# Patient Record
Sex: MALE | Race: WHITE | URBAN - METROPOLITAN AREA
[De-identification: names, ages, dates, MRNs, and addresses within clinical notes are randomized per-mention and may not be internally consistent; named-entity substitution may affect disease eponyms.]

---

## 2020-07-16 NOTE — PROGRESS NOTES
500 mg by mouth nightly      atenolol (TENORMIN) 50 MG tablet Take 1 tablet by mouth daily 90 tablet 0    omega-3 acid ethyl esters (LOVAZA) 1 G capsule Take 2 capsules by mouth 2 times daily 120 capsule 2    meloxicam (MOBIC) 7.5 MG tablet Take 1 tablet by mouth daily 30 tablet 2    furosemide (LASIX) 20 MG tablet Take 1/2 pill daily for 10 days 5 tablet 0    Cholecalciferol (VITAMIN D3) 65950 UNITS CAPS Take 1 tablet by mouth daily. 30 capsule 2     No current facility-administered medications for this visit. Review of Systems:  · Constitutional: No unanticipated weight loss. There's been no change in energy level, sleep pattern, or activity level. No fevers, chills. · Eyes: No visual changes or diplopia. No scleral icterus. · ENT: No Headaches, hearing loss or vertigo. No mouth sores or sore throat. · Cardiovascular: as reviewed in HPI  · Respiratory: No cough or wheezing, no sputum production. No hemoptysis. · Gastrointestinal: No abdominal pain, appetite loss, blood in stools. No change in bowel or bladder habits. · Genitourinary: No dysuria, trouble voiding, or hematuria. · Musculoskeletal:  No gait disturbance, no joint complaints. · Integumentary: No rash or pruritis. · Neurological: No headache, diplopia, change in muscle strength, numbness or tingling. · Psychiatric: No anxiety or depression. · Endocrine: No temperature intolerance. No excessive thirst, fluid intake, or urination. No tremor. · Hematologic/Lymphatic: No abnormal bruising or bleeding, blood clots or swollen lymph nodes. · Allergic/Immunologic: No nasal congestion or hives.     Physical Exam:   /80 (Site: Left Upper Arm, Position: Sitting)   Pulse 74   Temp 98.1 °F (36.7 °C)   Ht 5' 8\" (1.727 m)   Wt 182 lb (82.6 kg)   SpO2 98%   BMI 27.67 kg/m²   Wt Readings from Last 3 Encounters:   07/17/20 182 lb (82.6 kg)   05/21/15 182 lb 3.2 oz (82.6 kg)   02/20/15 191 lb (86.6 kg)     Constitutional: He is oriented to person, place, and time. He appears well-developed and well-nourished. In no acute distress. Head: Normocephalic and atraumatic. Pupils equal and round. Neck: Neck supple. No JVP or carotid bruit appreciated. No mass and no thyromegaly present. No lymphadenopathy present. Cardiovascular: Irregularly irreg with a normal rate. Normal heart sounds. Exam reveals no gallop and no friction rub. No murmur heard. Pulmonary/Chest: Effort normal and breath sounds normal. No respiratory distress. He has no wheezes, rhonchi or rales. Abdominal: Soft, non-tender. Bowel sounds are normal. He exhibits no organomegaly, mass or bruit. Extremities: No edema. No cyanosis or clubbing. Pulses are 2+ radial/carotid bilaterally. Neurological: No gross cranial nerve deficit. Coordination normal.   Skin: Skin is warm and dry. There is no rash or diaphoresis. Psychiatric: He has a normal mood and affect. His speech is normal and behavior is normal.     Lab Review:   FLP:    Lab Results   Component Value Date    TRIG 81 02/23/2015    HDL 55 02/23/2015    LDLCALC 114 02/23/2015    LABVLDL 28 11/15/2013     BUN/Creatinine:    Lab Results   Component Value Date    BUN 18 02/23/2015    CREATININE 1.01 02/23/2015       EKG Interpretation: 7/16/20 Atrial fibrillation. Nonspecific QRS widening. Image Review:   No stress test or echo on file     Assessment/Plan:  1) Chronic atrial fibrillation. Currently in Afib. Seemingly asymptomatic. CHADS-Vasc is 1 (HTN). Treatment options for atrial fibrillation discussed including rate control, anticoagulation options, antiarrhythmics, and cardioversion. Will arrange for an echocardiogram to further assess LVEF or any valvular abnormalities. He is agreeable with starting  mg daily and instructed to start after his surgery. Continue rate control strategy with beta-blocker. 2) Pre-operative risk assessment. Patient is low cardiac risk based on RCRI score of zero.  Patient's risk should not preclude him from proceeding with surgery. Suggest continuation of B-blocker in the david-operative period and instructed to start  mg daily after his surgery. 3) Essential hypertension. Controlled. Goal BP <130/80. Continue medical therapy. Follow up in 1 year. Thank you very much for allowing me to participate in the care of your patient. Please do not hesitate to contact me if you have any questions. Sincerely,  Trent Luna. Tianna Costa, [address] [place] [address] [place] [address]  Ph: [phone]  Fax: [phone]      This note was scribed in the presence of Dr Tianna Costa MD by Amaya Wood RN. Physician Attestation: The scribes documentation has been prepared under my direction and personally reviewed by me in its entirety. I confirm that the note above accurately reflects all work, treatment, procedures, and medical decision making performed by me. All portions of the note including but not limited to the chief complaint, history of present illness, physical exam, assessment and plan/medical decision making were personally reviewed, edited, and updated on the day of the visit.

## 2020-07-17 ENCOUNTER — OFFICE VISIT (OUTPATIENT)
Dept: CARDIOLOGY CLINIC | Age: 65
End: 2020-07-17
Payer: COMMERCIAL

## 2020-07-17 VITALS
WEIGHT: 182 LBS | TEMPERATURE: 98.1 F | SYSTOLIC BLOOD PRESSURE: 128 MMHG | HEIGHT: 68 IN | DIASTOLIC BLOOD PRESSURE: 80 MMHG | HEART RATE: 74 BPM | OXYGEN SATURATION: 98 % | BODY MASS INDEX: 27.58 KG/M2

## 2020-07-17 PROCEDURE — 93000 ELECTROCARDIOGRAM COMPLETE: CPT | Performed by: INTERNAL MEDICINE

## 2020-07-17 PROCEDURE — 99204 OFFICE O/P NEW MOD 45 MIN: CPT | Performed by: INTERNAL MEDICINE

## 2020-07-17 RX ORDER — UBIDECARENONE 75 MG
50 CAPSULE ORAL DAILY
COMMUNITY

## 2020-07-17 RX ORDER — METOPROLOL TARTRATE 100 MG/1
100 TABLET ORAL 2 TIMES DAILY
COMMUNITY

## 2020-07-17 RX ORDER — MULTIVIT WITH MINERALS/LUTEIN
1000 TABLET ORAL DAILY
COMMUNITY

## 2020-07-17 RX ORDER — LANOLIN ALCOHOL/MO/W.PET/CERES
500 CREAM (GRAM) TOPICAL NIGHTLY
COMMUNITY

## 2020-07-17 NOTE — PATIENT INSTRUCTIONS
shot. If you have had one before, ask your doctor whether you need another dose. Get a flu shot every year. If you must be around people with colds or flu, wash your hands often. Activity  · If your doctor recommends it, get more exercise. Walking is a good choice. Bit by bit, increase the amount you walk every day. Try for at least 30 minutes on most days of the week. You also may want to swim, bike, or do other activities. Your doctor may suggest that you join a cardiac rehabilitation program so that you can have help increasing your physical activity safely. · Start light exercise if your doctor says it is okay. Even a small amount will help you get stronger, have more energy, and manage stress. Walking is an easy way to get exercise. Start out by walking a little more than you did in the hospital. Gradually increase the amount you walk. · When you exercise, watch for signs that your heart is working too hard. You are pushing too hard if you cannot talk while you are exercising. If you become short of breath or dizzy or have chest pain, sit down and rest immediately. · Check your pulse regularly. Place two fingers on the artery at the palm side of your wrist, in line with your thumb. If your heartbeat seems uneven or fast, talk to your doctor. When should you call for help? [id number] anytime you think you may need emergency care. For example, call if:  · You have symptoms of a heart attack. These may include:  ? Chest pain or pressure, or a strange feeling in the chest.  ? Sweating. ? Shortness of breath. ? Nausea or vomiting. ? Pain, pressure, or a strange feeling in the back, neck, jaw, or upper belly or in one or both shoulders or arms. ? Lightheadedness or sudden weakness. ? A fast or irregular heartbeat. After you call 911, the  may tell you to chew 1 adult-strength or 2 to 4 low-dose aspirin. Wait for an ambulance. Do not try to drive yourself. · You have symptoms of a stroke.  These may include:  ? Sudden numbness, tingling, weakness, or loss of movement in your face, arm, or leg, especially on only one side of your body. ? Sudden vision changes. ? Sudden trouble speaking. ? Sudden confusion or trouble understanding simple statements. ? Sudden problems with walking or balance. ? A sudden, severe headache that is different from past headaches. · You passed out (lost consciousness). Call your doctor now or seek immediate medical care if:  · You have new or increased shortness of breath. · You feel dizzy or lightheaded, or you feel like you may faint. · Your heart rate becomes irregular. · You can feel your heart flutter in your chest or skip heartbeats. Tell your doctor if these symptoms are new or worse. Watch closely for changes in your health, and be sure to contact your doctor if you have any problems. Where can you learn more? Go to https://V-cube Japanpelori.Proclivity Systems. org and sign in to your Netcontinuum account. Enter U020 in the [employer] Effective Data box to learn more about \"Atrial Fibrillation: Care Instructions. \"     If you do not have an account, please click on the \"Sign Up Now\" link. Current as of: December 16, 2019               Content Version: 12.5  © 0659-5408 [employer]. Care instructions adapted under license by [facility] ([facility]). If you have questions about a medical condition or this instruction, always ask your healthcare professional. [place] [address] any warranty or liability for your use of this information. Patient Education        Taking Direct Oral Anticoagulants Safely: Care Instructions  Your Care Instructions     Direct oral anticoagulants (DOACs) are medicines that help prevent blood clots. They also help treat problems caused by blood clots. These medicines are also called blood thinners. Blood thinners don't really thin the blood. They slow down the time it takes for a blood clot to form.  They also keep existing blood medicines are safe. If you are not planning on getting pregnant, talk to your doctor about how you can prevent pregnancy. · Try to avoid injuries. For example, be careful when you are exercising or playing sports. Make your home safe to reduce your risk of falling. When should you call for help? [id number] anytime you think you may need emergency care. For example, call if:  · You have a sudden, severe headache that is different from past headaches. Call your doctor now or seek immediate medical care if:  · You have any abnormal bleeding, such as:  ? Nosebleeds. ? Vaginal bleeding that is different (heavier, more frequent, at a different time of the month) than what you are used to.  ? Bloody or black stools, or rectal bleeding. ? Bloody or pink urine. Watch closely for changes in your health, and be sure to contact your doctor if you have any problems. Where can you learn more? Go to https://HeyAnita.Edxact. org and sign in to your UpOut account. Enter L409 in the [employer] box to learn more about \"Taking Direct Oral Anticoagulants Safely: Care Instructions. \"     If you do not have an account, please click on the \"Sign Up Now\" link. Current as of: December 16, 2019               Content Version: 12.5  © 9455-1464 [employer]. Care instructions adapted under license by [facility] ([facility]). If you have questions about a medical condition or this instruction, always ask your healthcare professional. [place] [address] any warranty or liability for your use of this information. Examples of DOACs include:  · Apixaban (Eliquis). · Dabigatran (Pradaxa). · Edoxaban (Savaysa). · Rivaroxaban (Xarelto).

## 2020-07-20 ENCOUNTER — TELEPHONE (OUTPATIENT)
Dept: CARDIOLOGY CLINIC | Age: 65
End: 2020-07-20

## 2020-07-20 NOTE — TELEPHONE ENCOUNTER
Dawson Fails called from [facility] To get office notes and clearance notes from 7/17.     Dawson Fails # [phone] HRMATR [id number]

## 2021-08-11 NOTE — PROGRESS NOTES
[address]      Cardiology Consult    Anita Gold  1955 August 13, 2021    Referring Physician: Dr. Charbel Loya MD  Reason for Referral: atrial fibrillation    CC: \"I feel fine. \"     HPI:  The patient is 61 y.o. male with a past medical history significant for hypertension and atrial fibrillation who presents today for management of Afib. He was originally seen for a pre-operative risk assessment prior to right shoulder surgery. He had an EKG completed for his pre-op testing that showed atrial fibrillation. He reported a known diagnosis of Afib for many years and stated his primary care physician instructed him to follow up with cardiology prior to surgery. Today, he states overall he is doing well. He denies any new cardiac complaints and states he continues to remain active without any exertional symptoms. He denies any chest pains or worsening shortness of breath. He reports occasional episodes of his heart \"racing\" but states the episodes typically occur when he drinks alcohol. He admits he drinks heavily and typically will overeat. He reports compliance with his medications and tolerating. He denies any abnormal bleeding or bruising. Patient denies exertional chest pain/pressure, dyspnea at rest, worsening CONNER, PND, orthopnea, palpitations, lightheadedness, weight changes, LE edema, and syncope. Past Medical History:   Diagnosis Date    Atrial fibrillation ([address]     Hyperlipidemia     Hypertension     Low testosterone     Neck pain      History reviewed. No pertinent surgical history. History reviewed. No pertinent family history. Social History     Tobacco Use    Smoking status: Former Smoker    Smokeless tobacco: Never Used   Substance Use Topics    Alcohol use:  Yes    Drug use: No       No Known Allergies  Current Outpatient Medications   Medication Sig Dispense Refill    vitamin E 1000 units capsule Take 1,000 Units by mouth daily      metoprolol (LOPRESSOR) 100 MG tablet Take 100 mg by mouth 2 times daily      vitamin B-12 (CYANOCOBALAMIN) 100 MCG tablet Take 50 mcg by mouth daily      niacin (SLO-NIACIN) 500 MG extended release tablet Take 500 mg by mouth nightly      aspirin  MG EC tablet Take 1 tablet by mouth daily 90 tablet 3    omega-3 acid ethyl esters (LOVAZA) 1 G capsule Take 2 capsules by mouth 2 times daily 120 capsule 2    meloxicam (MOBIC) 7.5 MG tablet Take 1 tablet by mouth daily 30 tablet 2    furosemide (LASIX) 20 MG tablet Take 1/2 pill daily for 10 days 5 tablet 0    Cholecalciferol (VITAMIN D3) 12625 UNITS CAPS Take 1 tablet by mouth daily. 30 capsule 2     No current facility-administered medications for this visit. Review of Systems:  · Constitutional: No unanticipated weight loss. There's been no change in energy level, sleep pattern, or activity level. No fevers, chills. · Eyes: No visual changes or diplopia. No scleral icterus. · ENT: No Headaches, hearing loss or vertigo. No mouth sores or sore throat. · Cardiovascular: as reviewed in HPI  · Respiratory: No cough or wheezing, no sputum production. No hemoptysis. · Gastrointestinal: No abdominal pain, appetite loss, blood in stools. No change in bowel or bladder habits. · Genitourinary: No dysuria, trouble voiding, or hematuria. · Musculoskeletal:  No gait disturbance, no joint complaints. · Integumentary: No rash or pruritis. · Neurological: No headache, diplopia, change in muscle strength, numbness or tingling. · Psychiatric: No anxiety or depression. · Endocrine: No temperature intolerance. No excessive thirst, fluid intake, or urination. No tremor. · Hematologic/Lymphatic: No abnormal bruising or bleeding, blood clots or swollen lymph nodes. · Allergic/Immunologic: No nasal congestion or hives.     Physical Exam:   /80 (Site: Left Upper Arm, Position: Sitting)   Pulse 76   Ht 5' 8\" (1.727 m)   Wt 189 lb (85.7 kg)   SpO2 99%   BMI 28.74 kg/m²   Wt Readings from Last 3 Encounters:   08/13/21 189 lb (85.7 kg)   07/17/20 182 lb (82.6 kg)   05/21/15 182 lb 3.2 oz (82.6 kg)     Constitutional: He is oriented to person, place, and time. He appears well-developed and well-nourished. In no acute distress. Head: Normocephalic and atraumatic. Pupils equal and round. Neck: Neck supple. No JVP or carotid bruit appreciated. No mass and no thyromegaly present. No lymphadenopathy present. Cardiovascular: Irregularly irreg with a normal rate. Normal heart sounds. Exam reveals no gallop and no friction rub. No murmur heard. Pulmonary/Chest: Effort normal and breath sounds normal. No respiratory distress. He has no wheezes, rhonchi or rales. Abdominal: Soft, non-tender. Bowel sounds are normal. He exhibits no organomegaly, mass or bruit. Extremities: No edema. No cyanosis or clubbing. Pulses are 2+ radial/carotid bilaterally. Neurological: No gross cranial nerve deficit. Coordination normal.   Skin: Skin is warm and dry. There is no rash or diaphoresis. Psychiatric: He has a normal mood and affect. His speech is normal and behavior is normal.     Lab Review:   FLP:    Lab Results   Component Value Date    TRIG 81 02/23/2015    HDL 55 02/23/2015    LDLCALC 114 02/23/2015    LABVLDL 28 11/15/2013     BUN/Creatinine:    Lab Results   Component Value Date    BUN 18 02/23/2015    CREATININE 1.01 02/23/2015     EKG Interpretation: 7/16/20 Atrial fibrillation. Nonspecific QRS widening. 8/13/21 Atrial fibrillation. Nonspecific QRS widening. Image Review:   No stress or echo on file. Assessment/Plan:  1) Chronic atrial fibrillation. Seemingly asymptomatic. CHADS-Vasc is 1 (HTN). Treatment options for atrial fibrillation discussed including rate control, anticoagulation options, antiarrhythmics, and cardioversion. Will arrange for an echocardiogram to further assess LVEF or any valvular abnormalities. Discussed starting DOAC today with HTN and age.  He would prefer to remain on the  mg daily and instructed to call if willing to switch to a DOAC. Continue rate control strategy with beta-blocker. 2) Essential hypertension. Controlled. Goal BP <130/80. Continue medical therapy. 3) Alcohol abuse. Encouraged abstinence. Follow up in 11 months. Thank you very much for allowing me to participate in the care of your patient. Please do not hesitate to contact me if you have any questions. Sincerely,  Severo Gonzalez. Porfirio Oh, [address] [place] [address]  Ph: [phone]  Fax: [phone]    This note was scribed in the presence of Dr Porfirio Oh MD by Cecilia Daigle RN. Physician Attestation: The scribes documentation has been prepared under my direction and personally reviewed by me in its entirety. I confirm that the note above accurately reflects all work, treatment, procedures, and medical decision making performed by me. All portions of the note including but not limited to the chief complaint, history of present illness, physical exam, assessment and plan/medical decision making were personally reviewed, edited, and updated on the day of the visit.

## 2021-08-11 NOTE — PATIENT INSTRUCTIONS
Patient Education        DASH Diet: Care Instructions  Your Care Instructions     The DASH diet is an eating plan that can help lower your blood pressure. DASH stands for Dietary Approaches to Stop Hypertension. Hypertension is high blood pressure. The DASH diet focuses on eating foods that are high in calcium, potassium, and magnesium. These nutrients can lower blood pressure. The foods that are highest in these nutrients are fruits, vegetables, low-fat dairy products, nuts, seeds, and legumes. But taking calcium, potassium, and magnesium supplements instead of eating foods that are high in those nutrients does not have the same effect. The DASH diet also includes whole grains, fish, and poultry. The DASH diet is one of several lifestyle changes your doctor may recommend to lower your high blood pressure. Your doctor may also want you to decrease the amount of sodium in your diet. Lowering sodium while following the DASH diet can lower blood pressure even further than just the DASH diet alone. Follow-up care is a key part of your treatment and safety. Be sure to make and go to all appointments, and call your doctor if you are having problems. It's also a good idea to know your test results and keep a list of the medicines you take. How can you care for yourself at home? Following the DASH diet  · Eat 4 to 5 servings of fruit each day. A serving is 1 medium-sized piece of fruit, ½ cup chopped or canned fruit, 1/4 cup dried fruit, or 4 ounces (½ cup) of fruit juice. Choose fruit more often than fruit juice. · Eat 4 to 5 servings of vegetables each day. A serving is 1 cup of lettuce or raw leafy vegetables, ½ cup of chopped or cooked vegetables, or 4 ounces (½ cup) of vegetable juice. Choose vegetables more often than vegetable juice. · Get 2 to 3 servings of low-fat and fat-free dairy each day. A serving is 8 ounces of milk, 1 cup of yogurt, or 1 ½ ounces of cheese. · Eat 6 to 8 servings of grains each day. A serving is 1 slice of bread, 1 ounce of dry cereal, or ½ cup of cooked rice, pasta, or cooked cereal. Try to choose whole-grain products as much as possible. · Limit lean meat, poultry, and fish to 2 servings each day. A serving is 3 ounces, about the size of a deck of cards. · Eat 4 to 5 servings of nuts, seeds, and legumes (cooked dried beans, lentils, and split peas) each week. A serving is 1/3 cup of nuts, 2 tablespoons of seeds, or ½ cup of cooked beans or peas. · Limit fats and oils to 2 to 3 servings each day. A serving is 1 teaspoon of vegetable oil or 2 tablespoons of salad dressing. · Limit sweets and added sugars to 5 servings or less a week. A serving is 1 tablespoon jelly or jam, ½ cup sorbet, or 1 cup of lemonade. · Eat less than 2,300 milligrams (mg) of sodium a day. If you limit your sodium to 1,500 mg a day, you can lower your blood pressure even more. · Be aware that all of these are the suggested number of servings for people who eat 1,800 to 2,000 calories a day. Your recommended number of servings may be different if you need more or fewer calories. Tips for success  · Start small. Do not try to make dramatic changes to your diet all at once. You might feel that you are missing out on your favorite foods and then be more likely to not follow the plan. Make small changes, and stick with them. Once those changes become habit, add a few more changes. · Try some of the following:  ? Make it a goal to eat a fruit or vegetable at every meal and at snacks. This will make it easy to get the recommended amount of fruits and vegetables each day. ? Try yogurt topped with fruit and nuts for a snack or healthy dessert. ? Add lettuce, tomato, cucumber, and onion to sandwiches. ? Combine a ready-made pizza crust with low-fat mozzarella cheese and lots of vegetable toppings. Try using tomatoes, squash, spinach, broccoli, carrots, cauliflower, and onions. ?  Have a variety of cut-up vegetables with a low-fat dip as an appetizer instead of chips and dip. ? Sprinkle sunflower seeds or chopped almonds over salads. Or try adding chopped walnuts or almonds to cooked vegetables. ? Try some vegetarian meals using beans and peas. Add garbanzo or kidney beans to salads. Make burritos and tacos with mashed phelps beans or black beans. Where can you learn more? Go to https://TappnGo.LogicLoop. org and sign in to your op5 account. Enter K821 in the Glarity box to learn more about \"DASH Diet: Care Instructions. \"     If you do not have an account, please click on the \"Sign Up Now\" link. Current as of: August 31, 2020               Content Version: 12.9  © 2386-7340 [employer]. Care instructions adapted under license by [facility] ([facility]). If you have questions about a medical condition or this instruction, always ask your healthcare professional. [place] [address] any warranty or liability for your use of this information. Patient Education        Taking Direct Oral Anticoagulants Safely: Care Instructions  Your Care Instructions     Direct oral anticoagulants (DOACs) are medicines that help prevent blood clots. They also help treat problems caused by blood clots. These medicines are also called blood thinners. Blood thinners don't really thin the blood. They slow down the time it takes for a blood clot to form. They also keep existing blood clots from getting bigger. Blood thinners can help prevent a stroke caused by a heart rhythm problem (atrial fibrillation). This heart rhythm problem can form clots in the heart that can then go to the brain. Blood thinners can also help prevent or treat blood clots in the legs or lungs. Examples of DOACs include:  · Apixaban (Eliquis). · Dabigatran (Pradaxa). · Edoxaban (Savaysa). · Rivaroxaban (Xarelto). Blood thinners can help save lives. But they can also cause problems.  They can make you more likely to bleed. It's important to take them right and do everything you can to keep yourself safe. Follow-up care is a key part of your treatment and safety. Be sure to make and go to all appointments, and call your doctor if you are having problems. It's also a good idea to know your test results and keep a list of the medicines you take. How can you care for yourself at home? · Take your anticoagulant (blood thinner) exactly as your doctor prescribed them. · If you miss a dose, don't take an extra dose to make up for it. Your doctor can tell you exactly what to do so you don't take too much or too little. · Ask your pharmacist if you should take your blood thinner with food or without food. · Take your blood thinner at the same time every day. Be careful not to run out of them. · Ask your pharmacist how to store your blood thinner. · Don't take other medicines before talking to your doctor or pharmacist first. This includes prescription medicines, over-the-counter medicines, vitamins, and herbal products. It also includes aspirin and other pain relievers, such as ibuprofen (Motrin). · Tell your doctors, dentist, and pharmacist that you are taking a blood thinner. · Wear medical alert jewelry. This lets others know that you take a blood thinner. You can buy it at most drugstores. · If you are pregnant, breastfeeding, or trying to get pregnant, tell your doctor. You and your doctor will decide what medicines are safe. If you are not planning on getting pregnant, talk to your doctor about how you can prevent pregnancy. · Try to avoid injuries. For example, be careful when you are exercising or playing sports. Make your home safe to reduce your risk of falling. When should you call for help? Call 911 anytime you think you may need emergency care. For example, call if:    · You have a sudden, severe headache that is different from past headaches.    Call your doctor now or seek immediate medical care if:    · You have any abnormal bleeding, such as:  ? Nosebleeds. ? Vaginal bleeding that is different (heavier, more frequent, at a different time of the month) than what you are used to.  ? Bloody or black stools, or rectal bleeding. ? Bloody or pink urine. Watch closely for changes in your health, and be sure to contact your doctor if you have any problems. Where can you learn more? Go to https://ZopapePartnerbyteeb.[employer]. org and sign in to your [employer] account. Enter A968 in the Designlab box to learn more about \"Taking Direct Oral Anticoagulants Safely: Care Instructions. \"     If you do not have an account, please click on the \"Sign Up Now\" link. Current as of: August 31, 2020               Content Version: 12.9  © 1901-3677 [employer]. Care instructions adapted under license by [facility] ([facility]). If you have questions about a medical condition or this instruction, always ask your healthcare professional. [place] [address] any warranty or liability for your use of this information.

## 2021-08-13 ENCOUNTER — OFFICE VISIT (OUTPATIENT)
Dept: CARDIOLOGY CLINIC | Age: 66
End: 2021-08-13
Payer: COMMERCIAL

## 2021-08-13 VITALS
DIASTOLIC BLOOD PRESSURE: 80 MMHG | SYSTOLIC BLOOD PRESSURE: 136 MMHG | HEIGHT: 68 IN | WEIGHT: 189 LBS | HEART RATE: 76 BPM | OXYGEN SATURATION: 99 % | BODY MASS INDEX: 28.64 KG/M2

## 2021-08-13 DIAGNOSIS — I10 ESSENTIAL HYPERTENSION: ICD-10-CM

## 2021-08-13 DIAGNOSIS — I48.20 CHRONIC ATRIAL FIBRILLATION (HCC): Primary | ICD-10-CM

## 2021-08-13 PROCEDURE — G8427 DOCREV CUR MEDS BY ELIG CLIN: HCPCS | Performed by: INTERNAL MEDICINE

## 2021-08-13 PROCEDURE — 3017F COLORECTAL CA SCREEN DOC REV: CPT | Performed by: INTERNAL MEDICINE

## 2021-08-13 PROCEDURE — G8419 CALC BMI OUT NRM PARAM NOF/U: HCPCS | Performed by: INTERNAL MEDICINE

## 2021-08-13 PROCEDURE — 1036F TOBACCO NON-USER: CPT | Performed by: INTERNAL MEDICINE

## 2021-08-13 PROCEDURE — 99214 OFFICE O/P EST MOD 30 MIN: CPT | Performed by: INTERNAL MEDICINE

## 2021-08-13 PROCEDURE — 93000 ELECTROCARDIOGRAM COMPLETE: CPT | Performed by: INTERNAL MEDICINE

## 2023-09-07 LAB
ALBUMIN SERPL-MCNC: 4.8 G/DL (ref 3.4–5)
ALBUMIN/GLOB SERPL: 2.1 {RATIO} (ref 1.1–2.2)
ALP SERPL-CCNC: 121 U/L (ref 40–129)
ALT SERPL-CCNC: 17 U/L (ref 10–40)
ANION GAP SERPL CALCULATED.3IONS-SCNC: 9 MMOL/L (ref 3–16)
AST SERPL-CCNC: 25 U/L (ref 15–37)
BASOPHILS # BLD: 0 K/UL (ref 0–0.2)
BASOPHILS NFR BLD: 0.7 %
BILIRUB SERPL-MCNC: 0.5 MG/DL (ref 0–1)
BUN SERPL-MCNC: 16 MG/DL (ref 7–20)
CALCIUM SERPL-MCNC: 9.9 MG/DL (ref 8.3–10.6)
CHLORIDE SERPL-SCNC: 100 MMOL/L (ref 99–110)
CHOLEST SERPL-MCNC: 256 MG/DL (ref 0–199)
CO2 SERPL-SCNC: 28 MMOL/L (ref 21–32)
CREAT SERPL-MCNC: 1 MG/DL (ref 0.8–1.3)
DEPRECATED RDW RBC AUTO: 13.6 % (ref 12.4–15.4)
EOSINOPHIL # BLD: 0.2 K/UL (ref 0–0.6)
EOSINOPHIL NFR BLD: 3 %
GFR SERPLBLD CREATININE-BSD FMLA CKD-EPI: >60 ML/MIN/{1.73_M2}
GLUCOSE SERPL-MCNC: 98 MG/DL (ref 70–99)
HCT VFR BLD AUTO: 44.9 % (ref 40.5–52.5)
HDLC SERPL-MCNC: 57 MG/DL (ref 40–60)
HGB BLD-MCNC: 15.8 G/DL (ref 13.5–17.5)
LDLC SERPL CALC-MCNC: 168 MG/DL
LYMPHOCYTES # BLD: 1.9 K/UL (ref 1–5.1)
LYMPHOCYTES NFR BLD: 28.5 %
MCH RBC QN AUTO: 35.2 PG (ref 26–34)
MCHC RBC AUTO-ENTMCNC: 35.1 G/DL (ref 31–36)
MCV RBC AUTO: 100.2 FL (ref 80–100)
MONOCYTES # BLD: 0.7 K/UL (ref 0–1.3)
MONOCYTES NFR BLD: 10.1 %
NEUTROPHILS # BLD: 3.9 K/UL (ref 1.7–7.7)
NEUTROPHILS NFR BLD: 57.7 %
PLATELET # BLD AUTO: 180 K/UL (ref 135–450)
PMV BLD AUTO: 9.9 FL (ref 5–10.5)
POTASSIUM SERPL-SCNC: 4.9 MMOL/L (ref 3.5–5.1)
PROT SERPL-MCNC: 7.1 G/DL (ref 6.4–8.2)
RBC # BLD AUTO: 4.48 M/UL (ref 4.2–5.9)
SODIUM SERPL-SCNC: 137 MMOL/L (ref 136–145)
T4 FREE SERPL-MCNC: 1.2 NG/DL (ref 0.9–1.8)
TRIGL SERPL-MCNC: 157 MG/DL (ref 0–150)
TSH SERPL DL<=0.005 MIU/L-ACNC: 3.77 UIU/ML (ref 0.27–4.2)
VLDLC SERPL CALC-MCNC: 31 MG/DL
WBC # BLD AUTO: 6.8 K/UL (ref 4–11)

## 2023-09-09 LAB — T4BG SERPL-MCNC: 13.3 UG/ML (ref 13–30)

## 2024-08-22 ENCOUNTER — APPOINTMENT (OUTPATIENT)
Dept: CT IMAGING | Age: 69
End: 2024-08-22
Payer: MEDICARE

## 2024-08-22 ENCOUNTER — APPOINTMENT (OUTPATIENT)
Dept: GENERAL RADIOLOGY | Age: 69
End: 2024-08-22
Payer: MEDICARE

## 2024-08-22 ENCOUNTER — HOSPITAL ENCOUNTER (INPATIENT)
Age: 69
LOS: 1 days | Discharge: HOME | End: 2024-08-24
Attending: EMERGENCY MEDICINE
Payer: MEDICARE

## 2024-08-22 DIAGNOSIS — I10 ELEVATED BLOOD PRESSURE READING WITH DIAGNOSIS OF HYPERTENSION: ICD-10-CM

## 2024-08-22 DIAGNOSIS — G45.9 TIA (TRANSIENT ISCHEMIC ATTACK): Primary | ICD-10-CM

## 2024-08-22 DIAGNOSIS — Z91.148 NONCOMPLIANCE WITH MEDICATION REGIMEN: ICD-10-CM

## 2024-08-22 DIAGNOSIS — I48.20 CHRONIC ATRIAL FIBRILLATION (HCC): ICD-10-CM

## 2024-08-22 DIAGNOSIS — I48.91 ATRIAL FIBRILLATION, UNSPECIFIED TYPE (HCC): ICD-10-CM

## 2024-08-22 LAB
ALBUMIN SERPL-MCNC: 4.6 G/DL (ref 3.4–5)
ALBUMIN/GLOB SERPL: 1.8 {RATIO} (ref 1.1–2.2)
ALP SERPL-CCNC: 101 U/L (ref 40–129)
ALT SERPL-CCNC: 63 U/L (ref 10–40)
ANION GAP SERPL CALCULATED.3IONS-SCNC: 12 MMOL/L (ref 3–16)
AST SERPL-CCNC: 45 U/L (ref 15–37)
BASOPHILS # BLD: 0 K/UL (ref 0–0.2)
BASOPHILS NFR BLD: 0.5 %
BILIRUB SERPL-MCNC: 0.6 MG/DL (ref 0–1)
BUN SERPL-MCNC: 14 MG/DL (ref 7–20)
CALCIUM SERPL-MCNC: 9.4 MG/DL (ref 8.3–10.6)
CHLORIDE SERPL-SCNC: 102 MMOL/L (ref 99–110)
CO2 SERPL-SCNC: 26 MMOL/L (ref 21–32)
CREAT SERPL-MCNC: 1 MG/DL (ref 0.8–1.3)
DEPRECATED RDW RBC AUTO: 13.9 % (ref 12.4–15.4)
EOSINOPHIL # BLD: 0.2 K/UL (ref 0–0.6)
EOSINOPHIL NFR BLD: 3 %
ETHANOLAMINE SERPL-MCNC: 10 MG/DL (ref 0–0.08)
GFR SERPLBLD CREATININE-BSD FMLA CKD-EPI: 82 ML/MIN/{1.73_M2}
GLUCOSE BLD-MCNC: 157 MG/DL (ref 70–99)
GLUCOSE SERPL-MCNC: 139 MG/DL (ref 70–99)
HCT VFR BLD AUTO: 41.7 % (ref 40.5–52.5)
HGB BLD-MCNC: 14.6 G/DL (ref 13.5–17.5)
INR PPP: 0.98 (ref 0.85–1.15)
LYMPHOCYTES # BLD: 1.7 K/UL (ref 1–5.1)
LYMPHOCYTES NFR BLD: 24.3 %
MCH RBC QN AUTO: 35.1 PG (ref 26–34)
MCHC RBC AUTO-ENTMCNC: 34.9 G/DL (ref 31–36)
MCV RBC AUTO: 100.4 FL (ref 80–100)
MONOCYTES # BLD: 0.8 K/UL (ref 0–1.3)
MONOCYTES NFR BLD: 11.4 %
NEUTROPHILS # BLD: 4.4 K/UL (ref 1.7–7.7)
NEUTROPHILS NFR BLD: 60.8 %
PERFORMED ON: ABNORMAL
PLATELET # BLD AUTO: 172 K/UL (ref 135–450)
PMV BLD AUTO: 10.1 FL (ref 5–10.5)
POTASSIUM SERPL-SCNC: 4.3 MMOL/L (ref 3.5–5.1)
PROT SERPL-MCNC: 7.1 G/DL (ref 6.4–8.2)
PROTHROMBIN TIME: 13.2 SEC (ref 11.9–14.9)
RBC # BLD AUTO: 4.15 M/UL (ref 4.2–5.9)
SODIUM SERPL-SCNC: 140 MMOL/L (ref 136–145)
TROPONIN, HIGH SENSITIVITY: 11 NG/L (ref 0–22)
WBC # BLD AUTO: 7.2 K/UL (ref 4–11)

## 2024-08-22 PROCEDURE — 82077 ASSAY SPEC XCP UR&BREATH IA: CPT

## 2024-08-22 PROCEDURE — 99285 EMERGENCY DEPT VISIT HI MDM: CPT

## 2024-08-22 PROCEDURE — 6360000004 HC RX CONTRAST MEDICATION: Performed by: EMERGENCY MEDICINE

## 2024-08-22 PROCEDURE — 84484 ASSAY OF TROPONIN QUANT: CPT

## 2024-08-22 PROCEDURE — 70450 CT HEAD/BRAIN W/O DYE: CPT

## 2024-08-22 PROCEDURE — 93005 ELECTROCARDIOGRAM TRACING: CPT | Performed by: PHYSICIAN ASSISTANT

## 2024-08-22 PROCEDURE — 85610 PROTHROMBIN TIME: CPT

## 2024-08-22 PROCEDURE — 70496 CT ANGIOGRAPHY HEAD: CPT

## 2024-08-22 PROCEDURE — 71045 X-RAY EXAM CHEST 1 VIEW: CPT

## 2024-08-22 PROCEDURE — 80053 COMPREHEN METABOLIC PANEL: CPT

## 2024-08-22 PROCEDURE — 85025 COMPLETE CBC W/AUTO DIFF WBC: CPT

## 2024-08-22 RX ORDER — IOPAMIDOL 755 MG/ML
75 INJECTION, SOLUTION INTRAVASCULAR
Status: COMPLETED | OUTPATIENT
Start: 2024-08-22 | End: 2024-08-22

## 2024-08-22 RX ADMIN — IOPAMIDOL 75 ML: 755 INJECTION, SOLUTION INTRAVENOUS at 22:46

## 2024-08-22 ASSESSMENT — PAIN - FUNCTIONAL ASSESSMENT: PAIN_FUNCTIONAL_ASSESSMENT: NONE - DENIES PAIN

## 2024-08-22 ASSESSMENT — LIFESTYLE VARIABLES: HOW OFTEN DO YOU HAVE A DRINK CONTAINING ALCOHOL: 2-4 TIMES A MONTH

## 2024-08-23 ENCOUNTER — APPOINTMENT (OUTPATIENT)
Dept: MRI IMAGING | Age: 69
End: 2024-08-23
Payer: MEDICARE

## 2024-08-23 ENCOUNTER — APPOINTMENT (OUTPATIENT)
Age: 69
End: 2024-08-23
Attending: STUDENT IN AN ORGANIZED HEALTH CARE EDUCATION/TRAINING PROGRAM
Payer: MEDICARE

## 2024-08-23 PROBLEM — G45.9 TIA (TRANSIENT ISCHEMIC ATTACK): Status: ACTIVE | Noted: 2024-08-23

## 2024-08-23 LAB
CHOLEST SERPL-MCNC: 149 MG/DL (ref 0–199)
ECHO AO ASC DIAM: 4.1 CM
ECHO AO ASCENDING AORTA INDEX: 2 CM/M2
ECHO AO ROOT DIAM: 3.6 CM
ECHO AO ROOT INDEX: 1.76 CM/M2
ECHO AR MAX VEL PISA: 5 M/S
ECHO AV CUSP MM: 1.4 CM
ECHO AV EROA PISA: 0.2 CM2
ECHO AV MEAN GRADIENT: 13 MMHG
ECHO AV MEAN VELOCITY: 1.7 M/S
ECHO AV PEAK GRADIENT: 26 MMHG
ECHO AV PEAK VELOCITY: 2.5 M/S
ECHO AV REGURGITANT PHT: 524 MS
ECHO AV REGURGITANT VOLUME PISA: 45 ML
ECHO AV VENA CONTRACTA / LVOT DIAMETER: 0.22
ECHO AV VENA CONTRACTA AREA / LVOT AREA: 0.05
ECHO AV VENA CONTRACTA AREA: 0.6 CM2
ECHO AV VENA CONTRACTA: 0.5 CM
ECHO AV VTI: 52.7 CM
ECHO BSA: 2.09 M2
ECHO EST RA PRESSURE: 3 MMHG
ECHO LA AREA 2C: 22.4 CM2
ECHO LA AREA 4C: 27.7 CM2
ECHO LA DIAMETER INDEX: 2.49 CM/M2
ECHO LA DIAMETER: 5.1 CM
ECHO LA MAJOR AXIS: 7.4 CM
ECHO LA MINOR AXIS: 5.7 CM
ECHO LA TO AORTIC ROOT RATIO: 1.42
ECHO LA VOL MOD A2C: 70 ML (ref 18–58)
ECHO LA VOL MOD A4C: 84 ML (ref 18–58)
ECHO LA VOLUME INDEX MOD A2C: 34 ML/M2 (ref 16–34)
ECHO LA VOLUME INDEX MOD A4C: 41 ML/M2 (ref 16–34)
ECHO LV EDV A2C: 95 ML
ECHO LV EDV A4C: 108 ML
ECHO LV EDV INDEX A4C: 53 ML/M2
ECHO LV EDV NDEX A2C: 46 ML/M2
ECHO LV EJECTION FRACTION A2C: 62 %
ECHO LV EJECTION FRACTION A4C: 57 %
ECHO LV EJECTION FRACTION BIPLANE: 60 % (ref 55–100)
ECHO LV ESV A2C: 36 ML
ECHO LV ESV A4C: 47 ML
ECHO LV ESV INDEX A2C: 18 ML/M2
ECHO LV ESV INDEX A4C: 23 ML/M2
ECHO LV FRACTIONAL SHORTENING: 29 % (ref 28–44)
ECHO LV INTERNAL DIMENSION DIASTOLE INDEX: 2.88 CM/M2
ECHO LV INTERNAL DIMENSION DIASTOLIC: 5.9 CM (ref 4.2–5.9)
ECHO LV INTERNAL DIMENSION SYSTOLIC INDEX: 2.05 CM/M2
ECHO LV INTERNAL DIMENSION SYSTOLIC: 4.2 CM
ECHO LV IVSD: 1.5 CM (ref 0.6–1)
ECHO LV MASS 2D: 377.6 G (ref 88–224)
ECHO LV MASS INDEX 2D: 184.2 G/M2 (ref 49–115)
ECHO LV POSTERIOR WALL DIASTOLIC: 1.3 CM (ref 0.6–1)
ECHO LV RELATIVE WALL THICKNESS RATIO: 0.44
ECHO LVOT AREA: 4.2 CM2
ECHO LVOT DIAM: 2.3 CM
ECHO RA AREA 4C: 19.2 CM2
ECHO RA END SYSTOLIC VOLUME APICAL 4 CHAMBER INDEX BSA: 22 ML/M2
ECHO RA VOLUME: 45 ML
ECHO RIGHT VENTRICULAR SYSTOLIC PRESSURE (RVSP): 19 MMHG
ECHO RV BASAL DIMENSION: 3.3 CM
ECHO RV FREE WALL PEAK S': 13 CM/S
ECHO RV LONGITUDINAL DIMENSION: 7 CM
ECHO RV MID DIMENSION: 2.1 CM
ECHO RV TAPSE: 2 CM (ref 1.7–?)
ECHO TV REGURGITANT MAX VELOCITY: 1.98 M/S
ECHO TV REGURGITANT PEAK GRADIENT: 16 MMHG
EKG DIAGNOSIS: NORMAL
EKG Q-T INTERVAL: 390 MS
EKG QRS DURATION: 114 MS
EKG QTC CALCULATION (BAZETT): 474 MS
EKG R AXIS: 27 DEGREES
EKG T AXIS: 30 DEGREES
EKG VENTRICULAR RATE: 89 BPM
EST. AVERAGE GLUCOSE BLD GHB EST-MCNC: 88.2 MG/DL
HBA1C MFR BLD: 4.7 %
HDLC SERPL-MCNC: 57 MG/DL (ref 40–60)
LDLC SERPL CALC-MCNC: 80 MG/DL
TRIGL SERPL-MCNC: 60 MG/DL (ref 0–150)
VLDLC SERPL CALC-MCNC: 12 MG/DL

## 2024-08-23 PROCEDURE — 94760 N-INVAS EAR/PLS OXIMETRY 1: CPT

## 2024-08-23 PROCEDURE — 83036 HEMOGLOBIN GLYCOSYLATED A1C: CPT

## 2024-08-23 PROCEDURE — 2060000000 HC ICU INTERMEDIATE R&B

## 2024-08-23 PROCEDURE — 70553 MRI BRAIN STEM W/O & W/DYE: CPT

## 2024-08-23 PROCEDURE — 6370000000 HC RX 637 (ALT 250 FOR IP): Performed by: STUDENT IN AN ORGANIZED HEALTH CARE EDUCATION/TRAINING PROGRAM

## 2024-08-23 PROCEDURE — 92610 EVALUATE SWALLOWING FUNCTION: CPT

## 2024-08-23 PROCEDURE — 97161 PT EVAL LOW COMPLEX 20 MIN: CPT | Performed by: PHYSICAL THERAPIST

## 2024-08-23 PROCEDURE — 97530 THERAPEUTIC ACTIVITIES: CPT

## 2024-08-23 PROCEDURE — 93306 TTE W/DOPPLER COMPLETE: CPT | Performed by: INTERNAL MEDICINE

## 2024-08-23 PROCEDURE — 6360000002 HC RX W HCPCS: Performed by: STUDENT IN AN ORGANIZED HEALTH CARE EDUCATION/TRAINING PROGRAM

## 2024-08-23 PROCEDURE — 36415 COLL VENOUS BLD VENIPUNCTURE: CPT

## 2024-08-23 PROCEDURE — 93306 TTE W/DOPPLER COMPLETE: CPT

## 2024-08-23 PROCEDURE — 2580000003 HC RX 258: Performed by: STUDENT IN AN ORGANIZED HEALTH CARE EDUCATION/TRAINING PROGRAM

## 2024-08-23 PROCEDURE — 2500000003 HC RX 250 WO HCPCS: Performed by: STUDENT IN AN ORGANIZED HEALTH CARE EDUCATION/TRAINING PROGRAM

## 2024-08-23 PROCEDURE — 93010 ELECTROCARDIOGRAM REPORT: CPT | Performed by: INTERNAL MEDICINE

## 2024-08-23 PROCEDURE — 97116 GAIT TRAINING THERAPY: CPT | Performed by: PHYSICAL THERAPIST

## 2024-08-23 PROCEDURE — 80061 LIPID PANEL: CPT

## 2024-08-23 PROCEDURE — 92523 SPEECH SOUND LANG COMPREHEN: CPT

## 2024-08-23 PROCEDURE — A9579 GAD-BASE MR CONTRAST NOS,1ML: HCPCS | Performed by: STUDENT IN AN ORGANIZED HEALTH CARE EDUCATION/TRAINING PROGRAM

## 2024-08-23 PROCEDURE — 6360000004 HC RX CONTRAST MEDICATION: Performed by: STUDENT IN AN ORGANIZED HEALTH CARE EDUCATION/TRAINING PROGRAM

## 2024-08-23 PROCEDURE — 97165 OT EVAL LOW COMPLEX 30 MIN: CPT

## 2024-08-23 RX ORDER — ACETAMINOPHEN 325 MG/1
650 TABLET ORAL EVERY 6 HOURS PRN
Status: DISCONTINUED | OUTPATIENT
Start: 2024-08-23 | End: 2024-08-24 | Stop reason: HOSPADM

## 2024-08-23 RX ORDER — ATORVASTATIN CALCIUM 20 MG/1
20 TABLET, FILM COATED ORAL DAILY
Status: DISCONTINUED | OUTPATIENT
Start: 2024-08-23 | End: 2024-08-24

## 2024-08-23 RX ORDER — POLYETHYLENE GLYCOL 3350 17 G/17G
17 POWDER, FOR SOLUTION ORAL DAILY PRN
Status: DISCONTINUED | OUTPATIENT
Start: 2024-08-23 | End: 2024-08-24 | Stop reason: HOSPADM

## 2024-08-23 RX ORDER — SODIUM CHLORIDE 9 MG/ML
INJECTION, SOLUTION INTRAVENOUS PRN
Status: DISCONTINUED | OUTPATIENT
Start: 2024-08-23 | End: 2024-08-24 | Stop reason: HOSPADM

## 2024-08-23 RX ORDER — METOPROLOL TARTRATE 50 MG
100 TABLET ORAL 2 TIMES DAILY
Status: DISCONTINUED | OUTPATIENT
Start: 2024-08-23 | End: 2024-08-24

## 2024-08-23 RX ORDER — LISINOPRIL AND HYDROCHLOROTHIAZIDE 20; 25 MG/1; MG/1
1 TABLET ORAL DAILY
Status: ON HOLD | COMMUNITY
End: 2024-08-23

## 2024-08-23 RX ORDER — ACETAMINOPHEN 650 MG/1
650 SUPPOSITORY RECTAL EVERY 6 HOURS PRN
Status: DISCONTINUED | OUTPATIENT
Start: 2024-08-23 | End: 2024-08-24 | Stop reason: HOSPADM

## 2024-08-23 RX ORDER — ONDANSETRON 2 MG/ML
4 INJECTION INTRAMUSCULAR; INTRAVENOUS EVERY 6 HOURS PRN
Status: DISCONTINUED | OUTPATIENT
Start: 2024-08-23 | End: 2024-08-24 | Stop reason: HOSPADM

## 2024-08-23 RX ORDER — ASPIRIN 325 MG
325 TABLET, DELAYED RELEASE (ENTERIC COATED) ORAL DAILY
Status: DISCONTINUED | OUTPATIENT
Start: 2024-08-23 | End: 2024-08-24 | Stop reason: HOSPADM

## 2024-08-23 RX ORDER — SODIUM CHLORIDE 0.9 % (FLUSH) 0.9 %
5-40 SYRINGE (ML) INJECTION EVERY 12 HOURS SCHEDULED
Status: DISCONTINUED | OUTPATIENT
Start: 2024-08-23 | End: 2024-08-24 | Stop reason: HOSPADM

## 2024-08-23 RX ORDER — ENOXAPARIN SODIUM 100 MG/ML
40 INJECTION SUBCUTANEOUS DAILY
Status: DISCONTINUED | OUTPATIENT
Start: 2024-08-23 | End: 2024-08-24 | Stop reason: HOSPADM

## 2024-08-23 RX ORDER — MAGNESIUM SULFATE IN WATER 40 MG/ML
2000 INJECTION, SOLUTION INTRAVENOUS PRN
Status: DISCONTINUED | OUTPATIENT
Start: 2024-08-23 | End: 2024-08-24 | Stop reason: HOSPADM

## 2024-08-23 RX ORDER — POTASSIUM CHLORIDE 1500 MG/1
40 TABLET, EXTENDED RELEASE ORAL PRN
Status: DISCONTINUED | OUTPATIENT
Start: 2024-08-23 | End: 2024-08-24 | Stop reason: HOSPADM

## 2024-08-23 RX ORDER — POTASSIUM CHLORIDE 7.45 MG/ML
10 INJECTION INTRAVENOUS PRN
Status: DISCONTINUED | OUTPATIENT
Start: 2024-08-23 | End: 2024-08-24 | Stop reason: HOSPADM

## 2024-08-23 RX ORDER — SODIUM CHLORIDE 0.9 % (FLUSH) 0.9 %
5-40 SYRINGE (ML) INJECTION PRN
Status: DISCONTINUED | OUTPATIENT
Start: 2024-08-23 | End: 2024-08-24 | Stop reason: HOSPADM

## 2024-08-23 RX ORDER — ATORVASTATIN CALCIUM 20 MG/1
20 TABLET, FILM COATED ORAL DAILY
Status: ON HOLD | COMMUNITY
End: 2024-08-24 | Stop reason: HOSPADM

## 2024-08-23 RX ORDER — ATENOLOL 100 MG/1
100 TABLET ORAL DAILY
Status: ON HOLD | COMMUNITY
Start: 2024-06-20 | End: 2024-08-24 | Stop reason: HOSPADM

## 2024-08-23 RX ORDER — ONDANSETRON 4 MG/1
4 TABLET, ORALLY DISINTEGRATING ORAL EVERY 8 HOURS PRN
Status: DISCONTINUED | OUTPATIENT
Start: 2024-08-23 | End: 2024-08-24 | Stop reason: HOSPADM

## 2024-08-23 RX ADMIN — METOPROLOL TARTRATE 100 MG: 50 TABLET, FILM COATED ORAL at 20:27

## 2024-08-23 RX ADMIN — THIAMINE HYDROCHLORIDE: 100 INJECTION, SOLUTION INTRAMUSCULAR; INTRAVENOUS at 06:44

## 2024-08-23 RX ADMIN — GADOTERIDOL 18 ML: 279.3 INJECTION, SOLUTION INTRAVENOUS at 10:06

## 2024-08-23 RX ADMIN — Medication 10 ML: at 12:02

## 2024-08-23 RX ADMIN — ASPIRIN 325 MG: 325 TABLET, COATED ORAL at 12:01

## 2024-08-23 RX ADMIN — ATORVASTATIN CALCIUM 20 MG: 20 TABLET, FILM COATED ORAL at 12:01

## 2024-08-23 NOTE — ED PROVIDER NOTES
ED Attending Attestation Note    This patient was seen by the advanced practice provider.     I personally saw the patient. Management plan and care decisions have been discussed with me and I made/approved the management plan and take responsibility for patient management.     Briefly, 69 y.o. male presents with blurry vision and abnormal feeling ?numbness/weakness in left arm.  Said his coordination was off and he was having a hard time getting his glasses on.  He was watching TV at 7PM when symptoms started.    Vision back to normal.  He is now back to baseline.  Denies speech changes, leg weakness  Patient is not on anticoagulation    Focused exam:   Gen: 69 y.o. male, NAD, nontoxic appearing  HEENT: NCAT. MMM, PERRL. EOMI. visual fields intact  CV: RRR w/o MRG  Vascular: intact and symmetric radial and DP pulses bilaterally  Lungs: CTAB. No incr WOB.   Abdomen: Soft, nontender, nondistended. No rebound/guarding.   Neuro: awake and alert, speech clear w/o aphasia; intact and symmetric strength and sensation in all 4 extremities, CN 2-12 intact bilaterally finger-nose intact bilaterally, normal gait    NIH Stroke Scale  Interval: Reassessment  Level of Consciousness (1a): Alert  LOC Questions (1b): Answers both correctly  LOC Commands (1c): Performs both tasks correctly  Best Gaze (2): Normal  Visual (3): No visual loss  Facial Palsy (4): Normal symmetrical movement  Motor Arm, Left (5a): No drift  Motor Arm, Right (5b): No drift  Motor Leg, Left (6a): No drift  Motor Leg, Right (6b): No drift  Limb Ataxia (7): Absent  Sensory (8): Normal  Best Language (9): No aphasia  Dysarthria (10): Normal  Extinction and Inattention (11): No abnormality  Total: 0      MDM:   This is a 69-year-old gentleman presenting after an episode of visual disturbances and clumsiness/numbness of his left arm.  He is quite hypertensive with blood pressure 206/113.  He also has a history of A-fib.  Concern for TIA.  Currently NIH stroke

## 2024-08-23 NOTE — NURSE NAVIGATOR
4 Eyes Skin Assessment     NAME:  Tim Marino  YOB: 1955  MEDICAL RECORD NUMBER:  [id number]    The patient is being assessed for  Admission    I agree that at least one RN has performed a thorough Head to Toe Skin Assessment on the patient. ALL assessment sites listed below have been assessed.      Areas assessed by both nurses:    Head, Face, Ears, Shoulders, Back, Chest, Arms, Elbows, Hands, Sacrum. Buttock, Coccyx, Ischium, Legs. Feet and Heels, Under Medical Devices , and Other lower right back a bump he calls a masquito bite         Does the Patient have a Wound? No noted wound(s)       Agustín Prevention initiated by RN: No  Wound Care Orders initiated by RN: No    Pressure Injury (Stage 3,4, Unstageable, DTI, NWPT, and Complex wounds) if present, place Wound referral order by RN under : No    New Ostomies, if present place, Ostomy referral order under : No     Nurse 1 eSignature: Electronically signed by Rachna Mason RN on 8/23/24 at 4:36 AM EDT    **SHARE this note so that the co-signing nurse can place an eSignature**    Nurse 2 eSignature: Electronically signed by Altagracia Dia RN on 8/23/24 at 5:02 AM EDT

## 2024-08-23 NOTE — CARE COORDINATION
Discharge Planning:      (CM) reviewed the patient's chart to assess needs. Patient's Readmission Risk Score is 4%. Patient's medical insurance is  Payor: [facility] MEDICARE / Plan: [facility] MEDICARE ADVANTAGE / Product Type: *No Product type* / .  Patient's PCP is Rudolph Olguin MD .  No needs anticipated, at this time. CM team to follow. Staff to inform CM if additional discharge needs arise.    Pts preferred pharmacy is   [id number] - [place], [place] - [address] [place] [address] - P [phone] - F [phone]  [address] [place] [address]  Phone: [phone] Fax: [phone]    [id number] - [place], [place] - [address] - P [phone] - F [phone]  [address]  [place] [address]  Phone: [phone] Fax: [phone]     Electronically signed by JOE FOREMAN RN on 8/23/2024 at 4:10 PM

## 2024-08-23 NOTE — PROGRESS NOTES
Physical Therapy  Facility/Department: [address] PROGRESSIVE CARE  Physical Therapy Initial Assessment/Discharge Note    Name: Tim Marino  : 1955  MRN: [id number]  Date of Service: 2024    Discharge Recommendations:  Home independently   PT Equipment Recommendations  Equipment Needed: No      Tim Marino scored a 24/24 on the AM-PAC short mobility form.  At this time, no further PT is recommended upon discharge.  Recommend patient returns to prior setting with prior services.       Patient Diagnosis(es): The primary encounter diagnosis was TIA (transient ischemic attack). Diagnoses of Elevated blood pressure reading with diagnosis of hypertension, Atrial fibrillation, unspecified type (HCC), Noncompliance with medication regimen, and Chronic atrial fibrillation (HCC) were also pertinent to this visit.  Past Medical History:  has a past medical history of Atrial fibrillation (HCC), Hyperlipidemia, Hypertension, Low testosterone, Neck pain, and TIA (transient ischemic attack).  Past Surgical History:  has no past surgical history on file.    Assessment  Assessment: pt is a 70 yo male who was adm to hosp for CVA.  Pt appears to be at his baseline for functional mobility tasks and safe to return home; no further therapy indicated  Therapy Prognosis: Good  Decision Making: Low Complexity  No Skilled PT: Safe to return home  Requires PT Follow-Up: No  Activity Tolerance  Activity Tolerance: Patient tolerated evaluation without incident    Plan  Physical Therapy Plan  Additional Comments: no further therapy indicated  Safety Devices  Type of Devices: Call light within reach, Nurse notified, Left in bed (pt up ad leslye in room- RN aware)    Restrictions  Restrictions/Precautions  Restrictions/Precautions: Up Ad Leslye  Position Activity Restriction  Other position/activity restrictions: IV attached     Subjective  General  Chart Reviewed: Yes  Patient assessed for rehabilitation services?: Yes  Additional  session  Transfers  Sit to Stand: Independent  Stand to Sit: Independent  Ambulation  Surface: Level tile  Device: No Device  Assistance: Independent  Quality of Gait: stable, no LOB  Distance: 400'  Stairs/Curb  Stairs?: Yes  Stairs  # Steps : 3  Stairs Height: 8\"  Rails: Left ascending  Device: No Device  Assistance: Stand by assistance  Comment: only able to navigate 3 steps due to being attached to IV but pt was stable     Balance  Sitting - Static: Good  Sitting - Dynamic: Good  Standing - Static: Good  Standing - Dynamic: Good          AM-PAC - Mobility    AM-PAC Basic Mobility - Inpatient   How much help is needed turning from your back to your side while in a flat bed without using bedrails?: None  How much help is needed moving from lying on your back to sitting on the side of a flat bed without using bedrails?: None  How much help is needed moving to and from a bed to a chair?: None  How much help is needed standing up from a chair using your arms?: None  How much help is needed walking in hospital room?: None  How much help is needed climbing 3-5 steps with a railing?: None  AM-PAC Inpatient Mobility Raw Score : 24  AM-PAC Inpatient T-Scale Score : 61.14  Mobility Inpatient CMS 0-100% Score: 0  Mobility Inpatient CMS G-Code Modifier : CH       Education  Patient Education  Education Given To: Patient  Education Provided: Role of Therapy  Education Method: Verbal  Education Outcome: Verbalized understanding      Therapy Time   Individual Concurrent Group Co-treatment   Time In 1435         Time Out 1506         Minutes 31                 CARLOS A HERRERA PT   Electronically signed by CARLOS A HERRERA PT on 8/23/2024 at 3:07 PM

## 2024-08-23 NOTE — ED PROVIDER NOTES
[facility] EMERGENCY DEPARTMENT  EMERGENCY DEPARTMENT ENCOUNTER      Pt Name: Tim Marino  MRN: [id number]  Birthdate 1955  Date of evaluation: 8/22/2024  Provider: ARSEN Beck  PCP: Rudolph Olguin MD  Note Started: 11:15 PM EDT     This patient was also seen and evaluated by Attending Physician Korin Zapata MD.    CHIEF COMPLAINT       Chief Complaint   Patient presents with    Extremity Weakness     Left arm weakness from 1930. Pt states last time felt normal 1900. Blurry vision associated with left arm weakness while watching TV. Pt denies blurry vision at this time, pt states numbness/weakness still felt in left arm. Pt alert and oriented at present. Able to follow and answer all questions        HISTORY OF PRESENT ILLNESS   (Location, Timing/Onset, Context/Setting, Quality, Duration, Modifying Factors, Severity, Associated Signs and Symptoms)  Note limiting factors.     Tim Marino is a 69 y.o. male who presents reporting that around 7:00 PM he was sitting to watch TV, put his glasses and had blurred vision in both eyes that persisted for a while.  Says he just could not see straight.  Right around the same time he began having symptoms in his left arm which she describes as both weakness and numbness.  Says the visual symptoms have gone away but his arm right now has not quite returned to normal.  Cannot be more specific about the nature of the symptoms.  He denies any right-sided symptoms.  He says earlier today he had been having a relatively normal day, did go to a bar and had 1 beer, but no traumatic injuries, no recent illnesses or infections that he knows of.  Denies headache.  Denies confusion.  Denies chest pain or difficulty breathing.    Nursing Notes were all reviewed and agreed with or any disagreements were addressed in the HPI.    REVIEW OF SYSTEMS    (2-9 systems for level 4, 10 or more for level 5)     Positives and pertinent negatives as per

## 2024-08-23 NOTE — CONSULTS
Neurology Consult Note  Reason for Consult: TIA    Chief complaint: blurry vision, trouble w/ left arm    Dr Bales, Jerson Xiao MD asked me to see Tim Marino in consultation for evaluation of TIA    History of Present Illness:  Tim Marino is a 69 y.o. male who presents with possible stroke.     I obtained my information via interview w/ the patient, supplemented by chart review.     The patient says that he was preparing to watch the [employer] game last night, perhaps around 1900, when he noticed that he was having a hard time focusing.  He was reaching up w/ his LUE and it was hard to control and shaking.  He denies any additional symptoms.  He ended up coming to the hospital shortly after in order to be evaluated.      BP was 206/113.  CT head negative.  CTA head/neck L PCA stenosis.  No acute interventions were pursued.      Currently he feels much better nearly back to baseline.      He does have a hx of atrial fibrillation.  He saw Cardiology back in 2021 prior to a shoulder surgery.  He was not anticoagulated partly due to low CHADS VASc and it sounds like he wasn't necessarily interested.      Medical History:  Past Medical History:   Diagnosis Date    Atrial fibrillation (HCC)     Hyperlipidemia     Hypertension     Low testosterone     Neck pain     TIA (transient ischemic attack) 8/23/2024     History reviewed. No pertinent surgical history.    Scheduled Meds:   sodium chloride flush  5-40 mL IntraVENous 2 times per day    enoxaparin  40 mg SubCUTAneous Daily    aspirin  325 mg Oral Daily    atorvastatin  20 mg Oral Daily    metoprolol  100 mg Oral BID     Medications Prior to Admission:   atorvastatin (LIPITOR) 20 MG tablet, Take 1 tablet by mouth daily  metoprolol (LOPRESSOR) 100 MG tablet, Take 1 tablet by mouth 2 times daily  aspirin  MG EC tablet, Take 1 tablet by mouth daily  lisinopril-hydroCHLOROthiazide (PRINZIDE;ZESTORETIC) 20-25 MG per tablet, Take 1 tablet by mouth daily

## 2024-08-23 NOTE — PROGRESS NOTES
NAME:  Tim Marino  YOB: 1955  MEDICAL RECORD NUMBER:  [id number]  TODAYS DATE:  8/23/2024    Discussed personal risk factors for Stroke/TIA with patient/family, and ways to reduce the risk for a recurrent stroke. Patient's personal risk factors which were identified are:     [x] Alcohol Abuse: check with your physician before any alcohol consumption.   [x] Atrial fibrillation: may cause blood clots.  [] Drug Abuse: Seek help, talk with your doctor  [] Clotting Disorder  [] Diabetes  [] Family history of stroke or heart disease  [x] High Blood Pressure/Hypertension: work with your physician.  [] High cholesterol: monitor cholesterol levels with your physician.   [] Overweight/Obesity: work with your physician for your ideal body weight.  [] Physical Inactivity: get regular exercise as directed by your physician.   [] Personal history of previous TIA or stroke  [] Poor Diet; decrease salt (sodium) in your diet, follow diet directed by physician.   [] Smoking: Cigarette/Cigar: stop smoking.         Advised pt. that you can reduce your risk for stroke/TIA by modifying/controlling the risk factors that you have. Pt.advised to take the medications as prescribed, which will be detailed in the discharge instructions, and to not stop taking them without consulting their physician. In addition, pt. advised to maintain a healthy diet, exercise regularly and to not smoke.      [facility]'s Stroke treatment and prevention, Managing your recovery  notebook  provided and/or reviewed  with patient/family.  The notebook includes, but not limited to, sections addressing warning signs & symptoms of a stroke, which are: sudden numbness or weakness especially on one side of the body, sudden confusion, difficulty speaking or understanding, sudden changes in vision, sudden dizziness or loss of balance/ coordination, sudden severe headache, syncope and seizure.  The need to call EMS (911) immediately if signs &

## 2024-08-23 NOTE — H&P
V2.0  History and Physical      Name:  Tim Marino /Age/Sex: 1955  (69 y.o. male)   MRN & CSN:  [id number] & [id number] Encounter Date/Time: 2024 12:45 AM EDT   Location:  [id number]-1174/5111-01 PCP: Rudolph Olguin MD       Hospital Day: 2    Assessment and Plan:   Tim Marino is a 69 y.o. male with a pmh of chronic A-fib, hypertension, alcohol abuse who presents with TIA (transient ischemic attack)    TIA vs CVA rule out  -NIHSS 0  -Possibly related to uncontrolled hypertension  -CTA did show some high-grade stenosis of the P1  -Check echocardiogram  -MRI brain  -Check lipid profile  -Check hemoglobin A1c  -ABCD2 score of 6 therefore would favor starting patient on DAPT for 21 days then needs to have further discussion about potentially starting anticoagulation for his A-fib and would caution against triple therapy given his alcohol use  -Neurology consult    Hypertension  -Uncontrolled  -Patient reports she is no longer on lisinopril however says he is taking atenolol and metoprolol    Mild transaminitis  -Suspect secondary to alcohol use  -Consider right upper quadrant ultrasound    Chronic atrial fibrillation  -[id number] 1 not currently on anticoagulation reporting he takes aspirin daily  -Rate controlled  -Continue metoprolol    Alcohol abuse  -Rally pack  -CIWA    Disposition:   Current Living situation: Home  Expected Disposition: Home  Estimated D/C: 2 days    Diet ADULT DIET; Regular   DVT Prophylaxis [x] Lovenox, []  Heparin, [] SCDs, [] Ambulation,  [] Eliquis, [] Xarelto, [] Coumadin   Code Status Full Code         Medical Decision Making:  The following items were considered in medical decision making:  Discussion of patient care with other providers, including Dr. Korin Zapata who recommended inpatient admission  Reviewed clinical lab tests such as creatinine 1.0, potassium 4.3, ALT 63, AST 45, hemoglobin 14 point  Reviewed radiology tests, CT head negative for hemorrhage or  Stroke Symptoms TECHNOLOGIST PROVIDED HISTORY: Has a \"code stroke\" or \"stroke alert\" been called?->No Reason for exam:->Stroke Symptoms Decision Support Exception - unselect if not a suspected or confirmed emergency medical condition->Emergency Medical Condition (MA) Reason for Exam: stroke symptoms extremity weakness FINDINGS: CTA NECK: AORTIC ARCH/ARCH VESSELS: No dissection or arterial injury.  No significant stenosis of the brachiocephalic or subclavian arteries. CAROTID ARTERIES: No dissection, arterial injury, or hemodynamically significant stenosis by NASCET criteria. VERTEBRAL ARTERIES: No dissection, arterial injury, or significant stenosis. SOFT TISSUES: The lung apices are clear.  No cervical or superior mediastinal lymphadenopathy.  The larynx and pharynx are unremarkable.  No acute abnormality of the salivary and thyroid glands. BONES: No acute osseous abnormality. CTA HEAD: ANTERIOR CIRCULATION: No significant stenosis of the intracranial internal carotid, anterior cerebral, or middle cerebral arteries. No aneurysm. POSTERIOR CIRCULATION: There appears to be high-grade stenosis of the diminutive proximal left P1 segment.  The patient is effectively fetal origin of the left PCA as the posterior communicating artery is quite robust.  No significant stenosis of the vertebral, basilar, or posterior cerebral arteries. No aneurysm. OTHER: No dural venous sinus thrombosis on this non-dedicated study.  At the left paramedian scalp near the vertex, there is a 1.5 cm epidermal inclusion cyst.     1. High-grade stenosis of the proximal aspect of the diminutive left P1 segment. The patient is effectively fetal origin of the left PCA as the posterior communicating artery is quite robust. 2. Otherwise unremarkable CTA of the head and neck. 3. Incidental note of 1.5 cm epidermal inclusion cyst at the left paramedian scalp near the vertex.     XR CHEST PORTABLE    Result Date: 8/22/2024  EXAMINATION: ONE XRAY VIEW OF

## 2024-08-23 NOTE — PROGRESS NOTES
Pharmacy Medication Reconciliation Note     List of medications patient is currently taking is complete.    Source of information:   1. Rx disp history  2. Patient interview    Notes regarding home medications:   1. Added Atenolol 100mg daily  2. Removed Lisinopril/HCTZ 20/25  3. Removed Lovaza,Vit B12,Vitamin E    Denies taking any other OTC or herbal medications    Yassine Hylton RP, [facility] 8/23/2024 1:30 PM

## 2024-08-23 NOTE — PROGRESS NOTES
Occupational Therapy  Facility/Department: [address] PROGRESSIVE CARE  Occupational Therapy Initial Assessment    Name: Tim Marino  : 1955  MRN: [id number]  Date of Service: 2024    Discharge Recommendations:  Home independently  OT Equipment Recommendations  Equipment Needed: No     Tim Marino scored a 24/24 on the AM-PAC ADL Inpatient form.  At this time, no further OT is recommended upon discharge. Recommend patient returns to prior setting with prior services.       Patient Diagnosis(es): The primary encounter diagnosis was TIA (transient ischemic attack). Diagnoses of Elevated blood pressure reading with diagnosis of hypertension, Atrial fibrillation, unspecified type (HCC), Noncompliance with medication regimen, and Chronic atrial fibrillation (HCC) were also pertinent to this visit.  Past Medical History:  has a past medical history of Atrial fibrillation (HCC), Hyperlipidemia, Hypertension, Low testosterone, Neck pain, and TIA (transient ischemic attack).  Past Surgical History:  has no past surgical history on file.           Assessment  Assessment: 70 y/o male admitted 2024 with CVA. PTA pt lives at home alone and was independent with ADLs and functional mobility. Today, pt feels back to baseline. Pt ambulated unit independently with good balance. Pt declined ADLs but anticipate will be in for all ADLs. Pt safe to return home at discharge.  Prognosis: Good  Decision Making: Low Complexity  REQUIRES OT FOLLOW-UP: Yes  Activity Tolerance  Activity Tolerance: Patient Tolerated treatment well     Plan  Occupational Therapy Plan  Times Per Week: DC acute OT    Restrictions  Restrictions/Precautions  Restrictions/Precautions: Up Ad Leslye  Position Activity Restriction  Other position/activity restrictions: IV attached    Subjective  General  Chart Reviewed: Yes  Patient assessed for rehabilitation services?: Yes  Additional Pertinent Hx: 70 y/o male admitted 2024 with blurry vision  understanding    LUE AROM (degrees)  LUE AROM : WFL  Left Hand AROM (degrees)  Left Hand AROM: WFL  RUE AROM (degrees)  RUE AROM : WFL  Right Hand AROM (degrees)  Right Hand AROM: WFL      AM-PAC - ADL  AM-PAC Daily Activity - Inpatient   How much help is needed for putting on and taking off regular lower body clothing?: None  How much help is needed for bathing (which includes washing, rinsing, drying)?: None  How much help is needed for toileting (which includes using toilet, bedpan, or urinal)?: None  How much help is needed for putting on and taking off regular upper body clothing?: None  How much help is needed for taking care of personal grooming?: None  How much help for eating meals?: None  AM-PAC Inpatient Daily Activity Raw Score: 24  AM-PAC Inpatient ADL T-Scale Score : 57.54  ADL Inpatient CMS 0-100% Score: 0  ADL Inpatient CMS G-Code Modifier : CH    Goals  Short Term Goals  Time Frame for Short Term Goals: no acute OT goals identified  Patient Goals   Patient goals : to go home      Therapy Time   Individual Concurrent Group Co-treatment   Time In 1435         Time Out 1504         Minutes 29         Timed Code Treatment Minutes: 15 Minutes     This note to serve as OT d/c summary if pt is d/c-ed prior to next therapy session.    Jacinda Vee OTR/L

## 2024-08-23 NOTE — PROGRESS NOTES
weakness or numbness and tingling is currently.  He does have a history of A-fib however he is not on anticoagulation.  He says he takes aspirin daily.  He also says that he is a heavy drinker and is unable to provide me with the amount he drinks.  He last drank heavily 2 days ago, but did have a beer earlier today.  He denies ever having alcohol withdrawal before.   Consults noted: Neurology    IMAGING:  CXR: 8/22/2024  IMPRESSION:  Lungs are clear    CT HEAD: 8/22/2024  IMPRESSION:  Mild atrophy and mild chronic microischemic changes scattered in the deep white matter with no acute intracranial abnormality seen.  Probable incidental 12 mm sebaceous cyst along the vertex of the skull.    CTA HEAD/NECK: 8/22/2024  IMPRESSION:  1. High-grade stenosis of the proximal aspect of the diminutive left P1 segment. The patient is effectively fetal origin of the left PCA as the posterior communicating artery is quite robust.  2. Otherwise unremarkable CTA of the head and neck.  3. Incidental note of 1.5 cm epidermal inclusion cyst at the left paramedian scalp near the vertex.    BRAIN MRI: 8/23/2024  IMPRESSION:  1. Acute infarct in the anterior and superior left cerebellar hemisphere.  2. Cerebral parenchymal volume loss with mild chronic microvascular white  matter ischemic disease.  3. Small chronic lacunar infarct in the left thalamus.      Reason for referral: SLP evaluation orders received due to new CVA .  Current diet: Regular texture, Thin liquids    Respiratory status: Room Air       DYSPHAGIA BEDSIDE SWALLOW EVALUATION and SLP COGNITIVE-COMMUNICATION ASSESSMENT    Dysphagia Impressions/Dysphagia Diagnosis: Oropharyngeal Dysphagia   Dysphagia Diagnosis: Oral and pharyngeal stages appear grossly WFL.    Speech Diagnosis Impressions: Cognitive-Linguistic Deficits   Orientation: WFL  Receptive Language Processing: WFL  Expressive Language Expression: WFL  Motor speech/voice: WFL  Cognitive-linguistic: Patient appears  at cognitive-language baseline. Moderately reduced recall for novel information without and with delay; patient anticipating severity of difficulty during assessment d/t known errors during AWVs at PCP. Cognitive-language WFL otherwise. If patient desires to address recall imps, consideration of ST referral at next level of care is recommended.      Recommended Diet and Intervention:  Diet Solids Recommendation:  Regular texture Liquid Consistency Recommendation:  Thin liquids   Recommended form of Meds:   PO as tolerated      Compensatory Swallowing Strategies:  Upright as possible with all PO intake , Remain upright 30-45 min     Treatment recommendations: Cognitive-Linguistic  Frequency of treatment: 1-3 times over admission    SLP Therapeutic Intervention: Cognitive-Linguistic    GOALS:  SHORT TERM SPEECH/LANGUAGE/COGNITIVE GOALS  Patient will participate in ongoing assessment to confirm patient at baseline.    Dysphagia Prognosis: good  SLP treatment Prognosis: good, guarded  Barriers to Progress:  co-morbidities, prior level of function    Discharge Recommendations:  Patient agreeable to consideration for skilled ST services if desired for cognitive-language/recall    EDUCATION:   Provided education regarding role of SLP, results of assessment, recommendations and general speech pathology plan of care.   Patient Response: Pt verbalized understanding and agreement   Family education: Family not present/unavailable    TEST DATA:   Pain: Did not state    Vision: Wears Glasses  Hearing: WFL    Patient Positioning: Upright in bed     Oral Mechanism Exam:    Oral Hygiene: Moist, Clean   Mandible: WFL  Labial ROM: minimally reduced L  Lingual ROM: minimally reduced L  Velum: WFL  Gag: Intact  Volitional cough: Strong  Volitional swallow: Adequate  Voice: adequate  Dentition: Adequate    Consistencies presented:Regular texture, Puree texture, Thin liquids     Feeding Assistance: Self-feed  Oral Phase: WFL  Pharyngeal  Phase: WF    COMPREHENSION  Auditory Comprehension:  [facility]  Visual Language Processing: WFL    EXPRESSION  Verbal Expressive Language:  [facility]  Written Expressive Language:  [facility]  Pragmatics/Social Functioning: [facility]        MOTOR SPEECH/VOICE:  [facility]  Motor Speech:    Motor speech appeared to be grossly WNL with no dysarthria or apraxia assessed.  Voice:   WNL     COGNITION    Overall Orientation:  [facility]    Attention:  [facility]    Memory:  patient reports comparable to baseline; familiar with task from AWV screening  Impaired Short-term Memory: 3/5 with delay  Impaired Immediate/working Memory: 3/5  Daily Routines: independent    Problem Solving: [facility]    Math Language/numeric reasoning: [facility]  Serial calculations: [facility]   Time measurements: [facility]   Money management: [facility]     Safety/Judgement: [facility]      If patient discharges prior to next visit, this note will serve as discharge.     Time code minutes: 0  Total Time:  45    Electronically signed by:    Migdalia Martinez MA, CCC-SLP, #[id number]  Speech-Language Pathologist  Portable phone: [phone]   08/23/24 1:15 PM

## 2024-08-24 VITALS
BODY MASS INDEX: 30.01 KG/M2 | HEIGHT: 69 IN | HEART RATE: 123 BPM | OXYGEN SATURATION: 99 % | WEIGHT: 202.6 LBS | RESPIRATION RATE: 19 BRPM | SYSTOLIC BLOOD PRESSURE: 150 MMHG | DIASTOLIC BLOOD PRESSURE: 79 MMHG | TEMPERATURE: 98 F

## 2024-08-24 LAB
ANION GAP SERPL CALCULATED.3IONS-SCNC: 9 MMOL/L (ref 3–16)
BASOPHILS # BLD: 0.1 K/UL (ref 0–0.2)
BASOPHILS NFR BLD: 0.8 %
BUN SERPL-MCNC: 12 MG/DL (ref 7–20)
CALCIUM SERPL-MCNC: 9.3 MG/DL (ref 8.3–10.6)
CHLORIDE SERPL-SCNC: 106 MMOL/L (ref 99–110)
CO2 SERPL-SCNC: 24 MMOL/L (ref 21–32)
CREAT SERPL-MCNC: 0.8 MG/DL (ref 0.8–1.3)
DEPRECATED RDW RBC AUTO: 13.5 % (ref 12.4–15.4)
EOSINOPHIL # BLD: 0.2 K/UL (ref 0–0.6)
EOSINOPHIL NFR BLD: 2.4 %
GFR SERPLBLD CREATININE-BSD FMLA CKD-EPI: >90 ML/MIN/{1.73_M2}
GLUCOSE SERPL-MCNC: 89 MG/DL (ref 70–99)
HCT VFR BLD AUTO: 38.6 % (ref 40.5–52.5)
HGB BLD-MCNC: 13.5 G/DL (ref 13.5–17.5)
LYMPHOCYTES # BLD: 2.4 K/UL (ref 1–5.1)
LYMPHOCYTES NFR BLD: 33.7 %
MCH RBC QN AUTO: 34.9 PG (ref 26–34)
MCHC RBC AUTO-ENTMCNC: 35.1 G/DL (ref 31–36)
MCV RBC AUTO: 99.5 FL (ref 80–100)
MONOCYTES # BLD: 0.8 K/UL (ref 0–1.3)
MONOCYTES NFR BLD: 11.6 %
NEUTROPHILS # BLD: 3.7 K/UL (ref 1.7–7.7)
NEUTROPHILS NFR BLD: 51.5 %
PLATELET # BLD AUTO: 146 K/UL (ref 135–450)
PMV BLD AUTO: 9.5 FL (ref 5–10.5)
POTASSIUM SERPL-SCNC: 3.9 MMOL/L (ref 3.5–5.1)
RBC # BLD AUTO: 3.88 M/UL (ref 4.2–5.9)
SODIUM SERPL-SCNC: 139 MMOL/L (ref 136–145)
WBC # BLD AUTO: 7.1 K/UL (ref 4–11)

## 2024-08-24 PROCEDURE — 36415 COLL VENOUS BLD VENIPUNCTURE: CPT

## 2024-08-24 PROCEDURE — 80048 BASIC METABOLIC PNL TOTAL CA: CPT

## 2024-08-24 PROCEDURE — 99223 1ST HOSP IP/OBS HIGH 75: CPT | Performed by: INTERNAL MEDICINE

## 2024-08-24 PROCEDURE — 2580000003 HC RX 258: Performed by: STUDENT IN AN ORGANIZED HEALTH CARE EDUCATION/TRAINING PROGRAM

## 2024-08-24 PROCEDURE — 6370000000 HC RX 637 (ALT 250 FOR IP): Performed by: STUDENT IN AN ORGANIZED HEALTH CARE EDUCATION/TRAINING PROGRAM

## 2024-08-24 PROCEDURE — 94760 N-INVAS EAR/PLS OXIMETRY 1: CPT

## 2024-08-24 PROCEDURE — 6360000002 HC RX W HCPCS: Performed by: STUDENT IN AN ORGANIZED HEALTH CARE EDUCATION/TRAINING PROGRAM

## 2024-08-24 PROCEDURE — 85025 COMPLETE CBC W/AUTO DIFF WBC: CPT

## 2024-08-24 RX ORDER — CARVEDILOL 12.5 MG/1
12.5 TABLET ORAL 2 TIMES DAILY WITH MEALS
Status: DISCONTINUED | OUTPATIENT
Start: 2024-08-24 | End: 2024-08-24 | Stop reason: HOSPADM

## 2024-08-24 RX ORDER — ATORVASTATIN CALCIUM 40 MG/1
40 TABLET, FILM COATED ORAL DAILY
Status: DISCONTINUED | OUTPATIENT
Start: 2024-08-25 | End: 2024-08-24 | Stop reason: HOSPADM

## 2024-08-24 RX ORDER — CARVEDILOL 12.5 MG/1
12.5 TABLET ORAL 2 TIMES DAILY WITH MEALS
Qty: 60 TABLET | Refills: 0 | Status: SHIPPED | OUTPATIENT
Start: 2024-08-24

## 2024-08-24 RX ORDER — ATORVASTATIN CALCIUM 40 MG/1
40 TABLET, FILM COATED ORAL DAILY
Qty: 30 TABLET | Refills: 3 | Status: SHIPPED | OUTPATIENT
Start: 2024-08-25

## 2024-08-24 RX ORDER — ASPIRIN 325 MG
325 TABLET, DELAYED RELEASE (ENTERIC COATED) ORAL DAILY
Qty: 4 TABLET | Refills: 0 | Status: SHIPPED | OUTPATIENT
Start: 2024-08-25 | End: 2024-08-29

## 2024-08-24 RX ADMIN — ENOXAPARIN SODIUM 40 MG: 100 INJECTION SUBCUTANEOUS at 09:30

## 2024-08-24 RX ADMIN — ASPIRIN 325 MG: 325 TABLET, COATED ORAL at 09:22

## 2024-08-24 RX ADMIN — Medication 10 ML: at 09:23

## 2024-08-24 RX ADMIN — METOPROLOL TARTRATE 100 MG: 50 TABLET, FILM COATED ORAL at 09:22

## 2024-08-24 RX ADMIN — ATORVASTATIN CALCIUM 20 MG: 20 TABLET, FILM COATED ORAL at 09:22

## 2024-08-24 NOTE — PLAN OF CARE
Problem: Discharge Planning  Goal: Discharge to home or other facility with appropriate resources  8/24/2024 1443 by Serge Chance, RN  Outcome: Progressing     Problem: ABCDS Injury Assessment  Goal: Absence of physical injury  8/24/2024 1443 by Serge Chance, RN  Outcome: Progressing

## 2024-08-24 NOTE — CONSULTS
[facility]    Tim Marino  1955 August 24, 2024    Reason for Consult: \"Need anticoagulation recommendations\"    CC: Blurry vision    HPI:  The patient is 69 y.o. male with a past medical history significant for essential hypertension, hyperlipidemia, chronic atrial fibrillation and alcohol abuse who presented to [facility] emergency department with blurry vision.  He was felt to have symptoms consistent with a TIA.  He is not on anticoagulation for his chronic atrial fibrillation but we were asked to see him for recommendations for this.    Tim states that he was watching the "[employer] game and put his glasses on. The screen was \"a blur\". Several seconds later, he was having a hard time with his left arm and movement.  He called his brother to come to the ED. The arm issue went away on the way to the hospital. His vision had already improved prior to that.     His PCP is Dr. Sedrick Olguin.     Review of Systems:  Constitutional: No fatigue, weakness, night sweats or fever.   HEENT: No new vision difficulties or ringing in the ears.  Respiratory: No new SOB, PND, orthopnea or cough.   Cardiovascular: See HPI   GI: No n/v, diarrhea, constipation, abdominal pain or changes in bowel habits.  No melena, no hematochezia  : No urinary frequency, urgency, incontinence, hematuria or dysuria.  Skin: No cyanosis or skin lesions.  Musculoskeletal: No new muscle or joint pain.  Neurological: No syncope or TIA-like symptoms.  Psychiatric: No anxiety, insomnia or depression     Past Medical History:   Diagnosis Date    Atrial fibrillation (HCC)     Hyperlipidemia     Hypertension     Low testosterone     Neck pain     TIA (transient ischemic attack) 8/23/2024       Family history:  Reviewed. No pertinent family history of early CAD.     Social History     Tobacco Use    Smoking status: Former    Smokeless tobacco: Never   Substance Use Topics    Alcohol use: Yes    Drug use: No       No

## 2024-08-24 NOTE — PROGRESS NOTES
Hospitalist Progress Note  8/24/2024 7:46 AM    PCP: Rudolph Olguin MD    [id number]     Date of Admission: 8/22/2024                                                                                                                     HOSPITAL COURSE    Patient demographics:  The patient  Tim Marino is a 69 y.o. male     Significant past medical history:   Patient Active Problem List   Diagnosis    Hypertension    Hyperlipidemia    Low testosterone    Neck pain    Vitamin D deficiency    Chronic atrial fibrillation (HCC)    TIA (transient ischemic attack)         Presenting symptoms:  Left arm weakness     Diagnostic workup:      CONSULTS DURING ADMISSION :   IP CONSULT TO SPIRITUAL SERVICES  IP CONSULT TO NEUROLOGY  IP CONSULT TO CARDIOLOGY      Patient was diagnosed with:  CVA      Treatment while inpatient:  Tim Marino is a 69 y.o. male with past medical history significant for chronic A-fib, hypertension and  alcohol abuse.       Patient  presented with TIA (transient ischemic attack)                                                                                        ----------------------------------------------------------      SUBJECTIVE COMPLAINTS- Left arm weakness     Diet: ADULT DIET; Regular      OBJECTIVE:   Patient Active Problem List   Diagnosis    Hypertension    Hyperlipidemia    Low testosterone    Neck pain    Vitamin D deficiency    Chronic atrial fibrillation (HCC)    TIA (transient ischemic attack)       Allergies  Patient has no known allergies.    Medications    Scheduled Meds:   sodium chloride flush  5-40 mL IntraVENous 2 times per day    enoxaparin  40 mg SubCUTAneous Daily    aspirin  325 mg Oral Daily    atorvastatin  20 mg Oral Daily    metoprolol  100 mg Oral BID     Continuous Infusions:   sodium chloride       PRN Meds:  sodium chloride flush, sodium chloride, potassium chloride **OR** potassium alternative oral replacement **OR** potassium chloride, magnesium

## 2024-08-24 NOTE — PROGRESS NOTES
IV and telemetry monitor removed. Discharge paperwork completed and discussed with the patient. All questions answered. Patient has been made aware of follow up appointments that have been made and need to be made and patient verbalized understanding. Patient . All belongings have been packed up and sent with the patient. Patient will be driven home by his brother.

## 2024-08-24 NOTE — PROGRESS NOTES
Left neuro a message to confirm when patient can begin anticoagulation medication. Eliquis is recommended by cardiology.

## 2024-08-24 NOTE — PROGRESS NOTES
NAME:  Tim Marino  YOB: 1955  MEDICAL RECORD NUMBER:  [id number]  TODAYS DATE:  8/24/2024    Discussed personal risk factors for Stroke/TIA with patient/family, and ways to reduce the risk for a recurrent stroke. Patient's personal risk factors which were identified are:     [x] Alcohol Abuse: check with your physician before any alcohol consumption.   [x] Atrial fibrillation: may cause blood clots.  [] Drug Abuse: Seek help, talk with your doctor  [] Clotting Disorder  [] Diabetes  [] Family history of stroke or heart disease  [x] High Blood Pressure/Hypertension: work with your physician.  [] High cholesterol: monitor cholesterol levels with your physician.   [] Overweight/Obesity: work with your physician for your ideal body weight.  [] Physical Inactivity: get regular exercise as directed by your physician.   [] Personal history of previous TIA or stroke  [] Poor Diet; decrease salt (sodium) in your diet, follow diet directed by physician.   [] Smoking: Cigarette/Cigar: stop smoking.         Advised pt. that you can reduce your risk for stroke/TIA by modifying/controlling the risk factors that you have. Pt.advised to take the medications as prescribed, which will be detailed in the discharge instructions, and to not stop taking them without consulting their physician. In addition, pt. advised to maintain a healthy diet, exercise regularly and to not smoke.      [facility]'s Stroke treatment and prevention, Managing your recovery  notebook  provided and/or reviewed  with patient/family.  The notebook includes, but not limited to, sections addressing warning signs & symptoms of a stroke, which are: sudden numbness or weakness especially on one side of the body, sudden confusion, difficulty speaking or understanding, sudden changes in vision, sudden dizziness or loss of balance/ coordination, sudden severe headache, syncope and seizure.  The need to call EMS (911) immediately if signs &

## 2024-08-24 NOTE — PLAN OF CARE
Problem: Discharge Planning  Goal: Discharge to home or other facility with appropriate resources  8/24/2024 0133 by Altagracia Dia, RN  Outcome: Progressing  Flowsheets (Taken 8/24/2024 0133)  Discharge to home or other facility with appropriate resources:   Identify barriers to discharge with patient and caregiver   Arrange for needed discharge resources and transportation as appropriate     Problem: ABCDS Injury Assessment  Goal: Absence of physical injury  8/24/2024 0133 by Altagracia Dia, RN  Outcome: Progressing  Flowsheets (Taken 8/24/2024 0133)  Absence of Physical Injury: Implement safety measures based on patient assessment

## 2024-08-26 NOTE — DISCHARGE SUMMARY
Hospital Medicine Discharge Summary      Patient ID: Tim Marino , [id number]     Patient's PCP: Rudolph Olguin MD    Admit Date: 8/22/2024     Discharge Date: 8/24/2024      Admitting Physician: No admitting provider for patient encounter.    Discharge Physician: WNIDY EM MD     Discharge Diagnoses:     Active Hospital Problems    Diagnosis Date Noted    TIA (transient ischemic attack) [G45.9] 08/23/2024         The patient was seen and examined on the day of discharge and this discharge summary is in conjunction with any daily progress note from day of discharge.          HOSPITAL COURSE    Patient demographics:  The patient  Tim Marino is a 69 y.o. male      Significant past medical history:   Problem List       Patient Active Problem List   Diagnosis    Hypertension    Hyperlipidemia    Low testosterone    Neck pain    Vitamin D deficiency    Chronic atrial fibrillation (HCC)    TIA (transient ischemic attack)               Presenting symptoms:  Left arm weakness        Gen:   Alert and oriented ×3    Eyes: PERRL. No sclera icterus. No conjunctival injection.   ENT: No discharge. Pharynx clear. External appearance of ears and nose normal.  Neck: Trachea midline. No obvious mass.    Resp: No accessory muscle use. No crackles. No wheezes. No rhonchi.  CV: Regular rate. Regular rhythm. No murmur or rub. No edema.   GI: Non-tender. Non-distended. No hernia.   Lymph: No cervical LAD. No supraclavicular LAD.   M/S: / Ext. No cyanosis. No clubbing. No joint deformity.    Neuro: CN 2-12 are intact,  no neurologic deficits noted.     Diagnostic workup:   CT HEAD WO CONTRAST    CTA HEAD NECK W CONTRAST   MRI BRAIN W WO CONTRAST       CONSULTS DURING ADMISSION :   IP CONSULT TO SPIRITUAL SERVICES  IP CONSULT TO NEUROLOGY  IP CONSULT TO CARDIOLOGY        Patient was diagnosed with:  CVA        Treatment while inpatient:  Tim Marino is a 69 y.o. male with past medical history significant  for chronic A-fib, hypertension and  alcohol abuse.     Patient  presented with Left arm weakness. Patient was diagnosed  with acute cerebellar infarct that was noted on MRI.  Patient has history of atrial fibrillation and patient has not been on any anticoagulation cardiology was consulted and they recommended patient should be on anticoagulation.  Neurology recommended that patient can continue on aspirin but anticoagulation can be started in 1 week from the infarct.      Discharge Condition:  stable     Discharged to:  Home     Activity:   as tolerated:    Follow Up:  Follow-up with PCP in 1-2 weeks                                        Labs: For convenience and continuity at follow-up the following most recent labs are provided:      CBC:   Lab Results   Component Value Date/Time    WBC 7.1 08/24/2024 05:47 AM    HGB 13.5 08/24/2024 05:47 AM    HCT 38.6 08/24/2024 05:47 AM     08/24/2024 05:47 AM       RENAL:   Lab Results   Component Value Date/Time     08/24/2024 05:47 AM    K 3.9 08/24/2024 05:47 AM     08/24/2024 05:47 AM    CO2 24 08/24/2024 05:47 AM    BUN 12 08/24/2024 05:47 AM    CREATININE 0.8 08/24/2024 05:47 AM           Discharge Medications:      Medication List        START taking these medications      apixaban 5 MG Tabs tablet  Commonly known as: Eliquis  Take 1 tablet by mouth 2 times daily  Start taking on: August 30, 2024     carvedilol 12.5 MG tablet  Commonly known as: COREG  Take 1 tablet by mouth 2 times daily (with meals)            CHANGE how you take these medications      atorvastatin 40 MG tablet  Commonly known as: LIPITOR  Take 1 tablet by mouth daily  What changed:   medication strength  how much to take            CONTINUE taking these medications      aspirin 325 MG EC tablet  Take 1 tablet by mouth daily for 4 days     NIACIN PO     vitamin D 25 MCG (1000 UT) Tabs tablet  Commonly known as: CHOLECALCIFEROL            STOP taking these medications